# Patient Record
Sex: MALE | Race: WHITE | URBAN - METROPOLITAN AREA
[De-identification: names, ages, dates, MRNs, and addresses within clinical notes are randomized per-mention and may not be internally consistent; named-entity substitution may affect disease eponyms.]

---

## 2022-03-16 ENCOUNTER — OFFICE VISIT (OUTPATIENT)
Dept: FAMILY MEDICINE CLINIC | Age: 32
End: 2022-03-16
Payer: COMMERCIAL

## 2022-03-16 VITALS
WEIGHT: 195 LBS | HEART RATE: 61 BPM | OXYGEN SATURATION: 97 % | SYSTOLIC BLOOD PRESSURE: 118 MMHG | HEIGHT: 68 IN | BODY MASS INDEX: 29.55 KG/M2 | DIASTOLIC BLOOD PRESSURE: 80 MMHG

## 2022-03-16 DIAGNOSIS — Z11.3 SCREENING FOR STD (SEXUALLY TRANSMITTED DISEASE): ICD-10-CM

## 2022-03-16 DIAGNOSIS — Z00.00 ANNUAL PHYSICAL EXAM: Primary | ICD-10-CM

## 2022-03-16 DIAGNOSIS — Z23 NEED FOR VACCINATION: ICD-10-CM

## 2022-03-16 DIAGNOSIS — R53.83 FATIGUE, UNSPECIFIED TYPE: ICD-10-CM

## 2022-03-16 LAB
A/G RATIO: 2.3 (ref 1.1–2.2)
ALBUMIN SERPL-MCNC: 5.1 G/DL (ref 3.4–5)
ALP BLD-CCNC: 60 U/L (ref 40–129)
ALT SERPL-CCNC: 21 U/L (ref 10–40)
ANION GAP SERPL CALCULATED.3IONS-SCNC: 14 MMOL/L (ref 3–16)
AST SERPL-CCNC: 25 U/L (ref 15–37)
BILIRUB SERPL-MCNC: 1 MG/DL (ref 0–1)
BUN BLDV-MCNC: 17 MG/DL (ref 7–20)
CALCIUM SERPL-MCNC: 9.9 MG/DL (ref 8.3–10.6)
CHLORIDE BLD-SCNC: 102 MMOL/L (ref 99–110)
CHOLESTEROL, TOTAL: 166 MG/DL (ref 0–199)
CO2: 24 MMOL/L (ref 21–32)
CREAT SERPL-MCNC: 0.9 MG/DL (ref 0.9–1.3)
FOLATE: 17.27 NG/ML (ref 4.78–24.2)
GFR AFRICAN AMERICAN: >60
GFR NON-AFRICAN AMERICAN: >60
GLUCOSE BLD-MCNC: 94 MG/DL (ref 70–99)
HBV SURFACE AB TITR SER: 22.96 MIU/ML
HCT VFR BLD CALC: 41.8 % (ref 40.5–52.5)
HDLC SERPL-MCNC: 60 MG/DL (ref 40–60)
HEMOGLOBIN: 14 G/DL (ref 13.5–17.5)
HEPATITIS C ANTIBODY INTERPRETATION: NORMAL
LDL CHOLESTEROL CALCULATED: 96 MG/DL
MCH RBC QN AUTO: 31.4 PG (ref 26–34)
MCHC RBC AUTO-ENTMCNC: 33.5 G/DL (ref 31–36)
MCV RBC AUTO: 93.8 FL (ref 80–100)
PDW BLD-RTO: 13.4 % (ref 12.4–15.4)
PLATELET # BLD: 318 K/UL (ref 135–450)
PMV BLD AUTO: 8.5 FL (ref 5–10.5)
POTASSIUM SERPL-SCNC: 4.8 MMOL/L (ref 3.5–5.1)
RBC # BLD: 4.46 M/UL (ref 4.2–5.9)
SODIUM BLD-SCNC: 140 MMOL/L (ref 136–145)
TOTAL PROTEIN: 7.3 G/DL (ref 6.4–8.2)
TRIGL SERPL-MCNC: 52 MG/DL (ref 0–150)
TSH REFLEX: 0.86 UIU/ML (ref 0.27–4.2)
VITAMIN B-12: 376 PG/ML (ref 211–911)
VITAMIN D 25-HYDROXY: 19.2 NG/ML
VLDLC SERPL CALC-MCNC: 10 MG/DL
WBC # BLD: 3.5 K/UL (ref 4–11)

## 2022-03-16 PROCEDURE — 99385 PREV VISIT NEW AGE 18-39: CPT | Performed by: FAMILY MEDICINE

## 2022-03-16 PROCEDURE — 90471 IMMUNIZATION ADMIN: CPT | Performed by: FAMILY MEDICINE

## 2022-03-16 PROCEDURE — 90715 TDAP VACCINE 7 YRS/> IM: CPT | Performed by: FAMILY MEDICINE

## 2022-03-16 PROCEDURE — 36415 COLL VENOUS BLD VENIPUNCTURE: CPT | Performed by: FAMILY MEDICINE

## 2022-03-16 SDOH — ECONOMIC STABILITY: FOOD INSECURITY: WITHIN THE PAST 12 MONTHS, THE FOOD YOU BOUGHT JUST DIDN'T LAST AND YOU DIDN'T HAVE MONEY TO GET MORE.: NEVER TRUE

## 2022-03-16 SDOH — ECONOMIC STABILITY: FOOD INSECURITY: WITHIN THE PAST 12 MONTHS, YOU WORRIED THAT YOUR FOOD WOULD RUN OUT BEFORE YOU GOT MONEY TO BUY MORE.: NEVER TRUE

## 2022-03-16 ASSESSMENT — PATIENT HEALTH QUESTIONNAIRE - PHQ9
SUM OF ALL RESPONSES TO PHQ QUESTIONS 1-9: 0
SUM OF ALL RESPONSES TO PHQ9 QUESTIONS 1 & 2: 0
2. FEELING DOWN, DEPRESSED OR HOPELESS: 0
1. LITTLE INTEREST OR PLEASURE IN DOING THINGS: 0
SUM OF ALL RESPONSES TO PHQ QUESTIONS 1-9: 0

## 2022-03-16 ASSESSMENT — SOCIAL DETERMINANTS OF HEALTH (SDOH): HOW HARD IS IT FOR YOU TO PAY FOR THE VERY BASICS LIKE FOOD, HOUSING, MEDICAL CARE, AND HEATING?: NOT HARD AT ALL

## 2022-03-16 NOTE — PROGRESS NOTES
[address] Note    Date: 3/16/2022                                               Mayur Meeter:     Chief Complaint   Patient presents with    New Patient     has been fasting     Fatigue     low engery since october. . cannot really run long distance like he usually does        HPI   Pt here for annual exam.  Last tdap unknown. Currently takes no medicines. Pt has concerns for fatigue over the last 6 months. 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 Mood is stable, no depression. Does feel like he has more stress at work. No hair loss. No heat/cold intolerance. Patient would like to be screened for STIs. Reports being sexually active with both men and women over the last year. Denies any symptoms. There are no problems to display for this patient. History reviewed. No pertinent past medical history. No current outpatient medications on file. No current facility-administered medications for this visit. Allergies   Allergen Reactions    Penicillins Hives       Review of Systems   No CP, no SOB, no rash, no bruise, no HA, no vision change, no ankle swelling, no hearing problems, no LAD      Vitals:  /80   Pulse 61   Ht 5' 8.11\" (1.73 m)   Wt 195 lb (88.5 kg)   SpO2 97%   BMI 29.55 kg/m²     Wt Readings from Last 3 Encounters:   03/16/22 195 lb (88.5 kg)        Physical Exam   General:  Well-appearing, NAD, alert, non-toxic  HEENT:  Normocephalic, atraumatic. Pupils equal and round. TMs pearly with good landmarks. Moist mucous membranes. Normal dentition  NECK:  Supple, normal range of motion, no LAD, no meningeal signs, no JVD, nontender  CHEST/LUNGS: CTAB, no crackles, no wheeze, no rhonchi. Symmetric rise  CARDIOVASCULAR: RRR,  no murmur, no rub  ABDOMEN: Soft, non-tender, non-distended. No masses  EXTREMETIES: Normal movement of all extremities. No edema. No joint swelling.   SKIN:  No rash, no cellulitis, no bruising, no petechiae/purpura/vesicles/pustules/abscess  PSYCH:  A+O x 3; normal affect  NEURO:  GCS 15, CN2-12 grossly intact, no focal motor/sensory deficits, no cerebellar deficits, normal gait, normal speech      Assessment/Plan     27-year-old male here for annual exam.  Vital signs are stable. Check routine labs. Update Tdap today. Patient reports fatigue. This of unclear etiology, likely benign. Will check TSH and CBC in addition to the labs. We will also check a vitamin D and vitamin B12. Patient encouraged to adjust his workout routine accordingly. No sign depression. Patient desires STI work-up. Will check GC and chlamydia and hepatitis panel as well as HIV and syphilis. Discharged in stable condition at 10:32 AM.    1. Annual physical exam  -     CBC; Future  -     Comprehensive Metabolic Panel; Future  -     LIPID PANEL; Future  -     TSH with Reflex; Future  -     CBC with Auto Differential; Future  -     HIV Screen; Future  -     Hepatitis C Antibody; Future  2. Fatigue, unspecified type  -     Vitamin B12 & Folate; Future  -     Vitamin D 25 Hydroxy; Future  3. Screening for STD (sexually transmitted disease)  -     POCT Urinalysis no Micro  -     C.trachomatis N.gonorrhoeae DNA, Urine  -     RPR REFLEX; Future  -     Hepatitis B Surface Antibody;  Future       Orders Placed This Encounter   Procedures    C.trachomatis N.gonorrhoeae DNA, Urine    CBC     Standing Status:   Future     Standing Expiration Date:   3/16/2023    Comprehensive Metabolic Panel     Standing Status:   Future     Standing Expiration Date:   3/16/2023    LIPID PANEL     Standing Status:   Future     Standing Expiration Date:   3/16/2023     Order Specific Question:   Is Patient Fasting?/# of Hours     Answer:   10 hr fast    TSH with Reflex     Standing Status:   Future     Standing Expiration Date:   3/16/2023    CBC with Auto Differential     Standing Status:   Future     Standing Expiration Date:   3/16/2023    HIV Screen     Standing Status:   Future     Standing Expiration Date:   3/16/2023    Hepatitis C Antibody     Standing Status:   Future     Standing Expiration Date:   3/16/2023    Vitamin B12 & Folate     Standing Status:   Future     Standing Expiration Date:   3/16/2023    Vitamin D 25 Hydroxy     Standing Status:   Future     Standing Expiration Date:   3/16/2023    RPR REFLEX     Standing Status:   Future     Standing Expiration Date:   3/16/2023    Hepatitis B Surface Antibody     Standing Status:   Future     Standing Expiration Date:   3/16/2023    POCT Urinalysis no Micro       No follow-ups on file.     Ciara Ryan MD, MD    3/16/2022  10:37 AM

## 2022-03-17 LAB
HIV AG/AB: NORMAL
HIV ANTIGEN: NORMAL
HIV-1 ANTIBODY: NORMAL
HIV-2 AB: NORMAL
TOTAL SYPHILLIS IGG/IGM: NORMAL

## 2022-03-18 DIAGNOSIS — D72.829 LEUKOCYTOSIS, UNSPECIFIED TYPE: ICD-10-CM

## 2022-03-18 DIAGNOSIS — E55.9 VITAMIN D DEFICIENCY: Primary | ICD-10-CM

## 2022-03-18 LAB
C. TRACHOMATIS DNA ,URINE: NEGATIVE
N. GONORRHOEAE DNA, URINE: NEGATIVE

## 2022-03-18 RX ORDER — MELATONIN
1000 DAILY
Qty: 90 TABLET | Refills: 3 | Status: SHIPPED | OUTPATIENT
Start: 2022-03-18